# Patient Record
Sex: FEMALE | URBAN - METROPOLITAN AREA
[De-identification: names, ages, dates, MRNs, and addresses within clinical notes are randomized per-mention and may not be internally consistent; named-entity substitution may affect disease eponyms.]

---

## 2021-04-15 ENCOUNTER — WEB ENCOUNTER (OUTPATIENT)
Dept: URBAN - METROPOLITAN AREA CLINIC 80 | Facility: CLINIC | Age: 61
End: 2021-04-15

## 2021-04-16 ENCOUNTER — WEB ENCOUNTER (OUTPATIENT)
Dept: URBAN - METROPOLITAN AREA CLINIC 80 | Facility: CLINIC | Age: 61
End: 2021-04-16

## 2021-04-16 ENCOUNTER — OFFICE VISIT (OUTPATIENT)
Dept: URBAN - METROPOLITAN AREA CLINIC 80 | Facility: CLINIC | Age: 61
End: 2021-04-16
Payer: COMMERCIAL

## 2021-04-16 DIAGNOSIS — R09.89 GLOBUS SENSATION: ICD-10-CM

## 2021-04-16 DIAGNOSIS — R14.0 BLOATING: ICD-10-CM

## 2021-04-16 DIAGNOSIS — K59.00 CONSTIPATION, UNSPECIFIED CONSTIPATION TYPE: ICD-10-CM

## 2021-04-16 PROBLEM — 14760008: Status: ACTIVE | Noted: 2021-04-16

## 2021-04-16 PROCEDURE — 99204 OFFICE O/P NEW MOD 45 MIN: CPT | Performed by: INTERNAL MEDICINE

## 2021-04-16 RX ORDER — LEVOTHYROXINE SODIUM 0.11 MG/1
1 TABLET IN THE MORNING ON AN EMPTY STOMACH TABLET ORAL ONCE A DAY
Status: ACTIVE | COMMUNITY

## 2021-04-16 RX ORDER — ASPIRIN 81 MG/1
1 TABLET TABLET, COATED ORAL ONCE A DAY
Status: ACTIVE | COMMUNITY

## 2021-04-16 RX ORDER — DOCUSATE SODIUM 100 MG/1
1 CAPSULE AS NEEDED CAPSULE ORAL ONCE A DAY
Status: ACTIVE | COMMUNITY

## 2021-04-16 NOTE — HPI-TODAY'S VISIT:
She comes in today to discuss issues with constipation, bloating, weight gain and a globus sensation.  She noted the onset of symptoms in 2019.  At that time she was also diagnosed with hypothyroidism.  She reports her thryoid has still not nomralized and she is under the care of endocrinology. She also notes a persistent globus sensation but she denies classic heartburn symptoms.  She did have a colonoscopy in 2019 and had polyps and reports she is supposed to repeat in 2022.

## 2021-04-18 LAB
ENDOMYSIAL ANTIBODY IGA: NEGATIVE
IMMUNOGLOBULIN A, QN, SERUM: 134
T-TRANSGLUTAMINASE (TTG) IGA: <2

## 2021-04-22 ENCOUNTER — TELEPHONE ENCOUNTER (OUTPATIENT)
Dept: URBAN - METROPOLITAN AREA CLINIC 92 | Facility: CLINIC | Age: 61
End: 2021-04-22

## 2021-04-22 RX ORDER — DEXLANSOPRAZOLE 60 MG/1
1 CAPSULE CAPSULE, DELAYED RELEASE ORAL ONCE A DAY
Qty: 30 CAPSULE | Refills: 2 | OUTPATIENT
Start: 2021-04-22

## 2021-04-28 ENCOUNTER — TELEPHONE ENCOUNTER (OUTPATIENT)
Dept: URBAN - METROPOLITAN AREA CLINIC 80 | Facility: CLINIC | Age: 61
End: 2021-04-28

## 2021-04-28 RX ORDER — PANTOPRAZOLE SODIUM 40 MG/1
1 TABLET TABLET, DELAYED RELEASE ORAL ONCE A DAY
Qty: 30 TABLET | Refills: 2 | OUTPATIENT
Start: 2021-05-03

## 2021-06-18 ENCOUNTER — OFFICE VISIT (OUTPATIENT)
Dept: URBAN - METROPOLITAN AREA CLINIC 80 | Facility: CLINIC | Age: 61
End: 2021-06-18
Payer: COMMERCIAL

## 2021-06-18 ENCOUNTER — DASHBOARD ENCOUNTERS (OUTPATIENT)
Age: 61
End: 2021-06-18

## 2021-06-18 DIAGNOSIS — K21.9 GASTROESOPHAGEAL REFLUX DISEASE, UNSPECIFIED WHETHER ESOPHAGITIS PRESENT: ICD-10-CM

## 2021-06-18 PROCEDURE — 99214 OFFICE O/P EST MOD 30 MIN: CPT | Performed by: INTERNAL MEDICINE

## 2021-06-18 RX ORDER — RABEPRAZOLE SODIUM 20 MG/1
1 TABLET TABLET, DELAYED RELEASE ORAL ONCE A DAY
Qty: 30 TABLET | Refills: 2 | OUTPATIENT
Start: 2021-06-18

## 2021-06-18 RX ORDER — ASPIRIN 81 MG/1
1 TABLET TABLET, COATED ORAL ONCE A DAY
Status: ACTIVE | COMMUNITY

## 2021-06-18 RX ORDER — LEVOTHYROXINE SODIUM 0.11 MG/1
1 TABLET IN THE MORNING ON AN EMPTY STOMACH TABLET ORAL ONCE A DAY
Status: ACTIVE | COMMUNITY

## 2021-06-18 RX ORDER — DOCUSATE SODIUM 100 MG/1
1 CAPSULE AS NEEDED CAPSULE ORAL ONCE A DAY
Status: ACTIVE | COMMUNITY

## 2021-06-18 RX ORDER — DEXLANSOPRAZOLE 60 MG/1
1 CAPSULE CAPSULE, DELAYED RELEASE ORAL ONCE A DAY
Qty: 30 CAPSULE | Refills: 2 | Status: ACTIVE | COMMUNITY
Start: 2021-04-22

## 2021-06-18 RX ORDER — PANTOPRAZOLE SODIUM 40 MG/1
1 TABLET TABLET, DELAYED RELEASE ORAL ONCE A DAY
Qty: 30 TABLET | Refills: 2 | Status: ACTIVE | COMMUNITY
Start: 2021-05-03

## 2021-06-18 NOTE — HPI-TODAY'S VISIT:
She comes in today for interval follow up.  Since being on PPI therapy she is doing better overall.  She initially tried dexilant which seemed to help all day but it was not covered by her insurance. She is currently taking pantoprazole which seems to help in the morning but she notes a return of symptoms in the afternoon.  She is frustrated by her weight.